# Patient Record
Sex: MALE | Race: WHITE | NOT HISPANIC OR LATINO | Employment: FULL TIME | URBAN - METROPOLITAN AREA
[De-identification: names, ages, dates, MRNs, and addresses within clinical notes are randomized per-mention and may not be internally consistent; named-entity substitution may affect disease eponyms.]

---

## 2017-12-12 DIAGNOSIS — Z00.00 ROUTINE GENERAL MEDICAL EXAMINATION AT A HEALTH CARE FACILITY: Primary | ICD-10-CM

## 2018-11-19 DIAGNOSIS — Z00.00 ROUTINE GENERAL MEDICAL EXAMINATION AT A HEALTH CARE FACILITY: Primary | ICD-10-CM

## 2018-12-17 ENCOUNTER — HOSPITAL ENCOUNTER (OUTPATIENT)
Dept: RADIOLOGY | Facility: HOSPITAL | Age: 53
Discharge: HOME | End: 2018-12-17
Attending: FAMILY MEDICINE

## 2018-12-17 ENCOUNTER — CLINICAL SUPPORT (OUTPATIENT)
Dept: CARDIOLOGY | Facility: CLINIC | Age: 53
End: 2018-12-17

## 2018-12-17 ENCOUNTER — OFFICE VISIT (OUTPATIENT)
Dept: INTERNAL MEDICINE | Facility: CLINIC | Age: 53
End: 2018-12-17
Payer: COMMERCIAL

## 2018-12-17 ENCOUNTER — CLINICAL SUPPORT (OUTPATIENT)
Dept: INTERNAL MEDICINE | Facility: CLINIC | Age: 53
End: 2018-12-17
Payer: COMMERCIAL

## 2018-12-17 ENCOUNTER — CLINICAL SUPPORT (OUTPATIENT)
Dept: AUDIOLOGY | Facility: CLINIC | Age: 53
End: 2018-12-17

## 2018-12-17 ENCOUNTER — CLINICAL SUPPORT (OUTPATIENT)
Dept: PULMONOLOGY | Facility: CLINIC | Age: 53
End: 2018-12-17

## 2018-12-17 VITALS
RESPIRATION RATE: 18 BRPM | OXYGEN SATURATION: 96 % | HEART RATE: 114 BPM | BODY MASS INDEX: 33.64 KG/M2 | WEIGHT: 240.31 LBS | BODY MASS INDEX: 33.64 KG/M2 | HEART RATE: 114 BPM | RESPIRATION RATE: 18 BRPM | SYSTOLIC BLOOD PRESSURE: 144 MMHG | HEIGHT: 71 IN | WEIGHT: 240.31 LBS | HEIGHT: 71 IN | DIASTOLIC BLOOD PRESSURE: 88 MMHG | TEMPERATURE: 99 F | SYSTOLIC BLOOD PRESSURE: 144 MMHG | DIASTOLIC BLOOD PRESSURE: 88 MMHG

## 2018-12-17 DIAGNOSIS — I10 ESSENTIAL HYPERTENSION: ICD-10-CM

## 2018-12-17 DIAGNOSIS — Z00.00 ROUTINE GENERAL MEDICAL EXAMINATION AT A HEALTH CARE FACILITY: Primary | ICD-10-CM

## 2018-12-17 DIAGNOSIS — Z01.12 ENCOUNTER FOR HEARING CONSERVATION AND TREATMENT: Primary | ICD-10-CM

## 2018-12-17 DIAGNOSIS — Z00.00 ROUTINE GENERAL MEDICAL EXAMINATION AT A HEALTH CARE FACILITY: ICD-10-CM

## 2018-12-17 DIAGNOSIS — Z00.00 ENCOUNTER FOR WELLNESS EXAMINATION: Primary | ICD-10-CM

## 2018-12-17 LAB
ALBUMIN SERPL BCP-MCNC: 4.2 G/DL
ALP SERPL-CCNC: 85 U/L
ALT SERPL W/O P-5'-P-CCNC: 35 U/L
ANION GAP SERPL CALC-SCNC: 11 MMOL/L
AST SERPL-CCNC: 22 U/L
BILIRUB SERPL-MCNC: 0.5 MG/DL
BILIRUB UR QL STRIP: NEGATIVE
BUN SERPL-MCNC: 16 MG/DL
CALCIUM SERPL-MCNC: 10.1 MG/DL
CHLORIDE SERPL-SCNC: 100 MMOL/L
CHOLEST SERPL-MCNC: 197 MG/DL
CHOLEST/HDLC SERPL: 4.5 {RATIO}
CLARITY UR: CLEAR
CO2 SERPL-SCNC: 26 MMOL/L
COLOR UR: YELLOW
COMPLEXED PSA SERPL-MCNC: 0.38 NG/ML
CREAT SERPL-MCNC: 0.9 MG/DL
CRP SERPL-MCNC: 23.75 MG/L
DIASTOLIC DYSFUNCTION: NO
ERYTHROCYTE [DISTWIDTH] IN BLOOD BY AUTOMATED COUNT: 12.4 %
EST. GFR  (AFRICAN AMERICAN): >60 ML/MIN/1.73 M^2
EST. GFR  (NON AFRICAN AMERICAN): >60 ML/MIN/1.73 M^2
ESTIMATED AVG GLUCOSE: 100 MG/DL
GLUCOSE SERPL-MCNC: 113 MG/DL
GLUCOSE UR QL STRIP: NEGATIVE
HBA1C MFR BLD HPLC: 5.1 %
HCT VFR BLD AUTO: 46.4 %
HDLC SERPL-MCNC: 44 MG/DL
HDLC SERPL: 22.3 %
HGB BLD-MCNC: 15.5 G/DL
HGB UR QL STRIP: NEGATIVE
KETONES UR QL STRIP: NEGATIVE
LDLC SERPL CALC-MCNC: 130.2 MG/DL
LEUKOCYTE ESTERASE UR QL STRIP: NEGATIVE
MCH RBC QN AUTO: 31.2 PG
MCHC RBC AUTO-ENTMCNC: 33.4 G/DL
MCV RBC AUTO: 93 FL
NITRITE UR QL STRIP: NEGATIVE
NONHDLC SERPL-MCNC: 153 MG/DL
PH UR STRIP: 6 [PH] (ref 5–8)
PLATELET # BLD AUTO: 284 K/UL
PMV BLD AUTO: 11.1 FL
POTASSIUM SERPL-SCNC: 3.9 MMOL/L
PROT SERPL-MCNC: 8 G/DL
PROT UR QL STRIP: NEGATIVE
RBC # BLD AUTO: 4.97 M/UL
SODIUM SERPL-SCNC: 137 MMOL/L
SP GR UR STRIP: 1.01 (ref 1–1.03)
TRIGL SERPL-MCNC: 114 MG/DL
TSH SERPL DL<=0.005 MIU/L-ACNC: 2.28 UIU/ML
URN SPEC COLLECT METH UR: NORMAL
WBC # BLD AUTO: 7.43 K/UL

## 2018-12-17 PROCEDURE — 83655 ASSAY OF LEAD: CPT

## 2018-12-17 PROCEDURE — 83036 HEMOGLOBIN GLYCOSYLATED A1C: CPT

## 2018-12-17 PROCEDURE — 84443 ASSAY THYROID STIM HORMONE: CPT

## 2018-12-17 PROCEDURE — 84153 ASSAY OF PSA TOTAL: CPT

## 2018-12-17 PROCEDURE — 81003 URINALYSIS AUTO W/O SCOPE: CPT | Mod: PO

## 2018-12-17 PROCEDURE — 80053 COMPREHEN METABOLIC PANEL: CPT | Mod: PO

## 2018-12-17 PROCEDURE — 93015 CV STRESS TEST SUPVJ I&R: CPT | Mod: S$GLB,,, | Performed by: NUCLEAR MEDICINE

## 2018-12-17 PROCEDURE — 80061 LIPID PANEL: CPT | Mod: PO

## 2018-12-17 PROCEDURE — 86141 C-REACTIVE PROTEIN HS: CPT

## 2018-12-17 PROCEDURE — 94010 BREATHING CAPACITY TEST: CPT | Mod: S$GLB,,, | Performed by: INTERNAL MEDICINE

## 2018-12-17 PROCEDURE — 99396 PREV VISIT EST AGE 40-64: CPT | Mod: S$GLB,,, | Performed by: FAMILY MEDICINE

## 2018-12-17 PROCEDURE — 85027 COMPLETE CBC AUTOMATED: CPT | Mod: PO

## 2018-12-17 PROCEDURE — 93000 ELECTROCARDIOGRAM COMPLETE: CPT | Mod: S$GLB,,, | Performed by: NUCLEAR MEDICINE

## 2018-12-17 PROCEDURE — 92552 PURE TONE AUDIOMETRY AIR: CPT | Mod: S$GLB,,, | Performed by: AUDIOLOGIST

## 2018-12-17 PROCEDURE — 99999 PR PBB SHADOW E&M-EST. PATIENT-LVL III: CPT | Mod: PBBFAC,,, | Performed by: FAMILY MEDICINE

## 2018-12-17 PROCEDURE — 71046 X-RAY EXAM CHEST 2 VIEWS: CPT | Mod: 26,,, | Performed by: RADIOLOGY

## 2018-12-17 PROCEDURE — 71046 X-RAY EXAM CHEST 2 VIEWS: CPT | Mod: TC,FY,PO

## 2018-12-17 RX ORDER — ASPIRIN 81 MG/1
81 TABLET ORAL DAILY PRN
COMMUNITY

## 2018-12-17 RX ORDER — AMLODIPINE AND BENAZEPRIL HYDROCHLORIDE 5; 20 MG/1; MG/1
1 CAPSULE ORAL DAILY
COMMUNITY
Start: 2018-08-24 | End: 2021-11-29 | Stop reason: SDUPTHER

## 2018-12-17 RX ORDER — AMOXICILLIN 500 MG
1 CAPSULE ORAL DAILY
COMMUNITY

## 2018-12-17 RX ORDER — ASCORBATE CALCIUM 500 MG
500 TABLET ORAL DAILY PRN
COMMUNITY
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
## 2018-12-17 NOTE — PATIENT INSTRUCTIONS
Check on your tetanus immunization. Be sure to follow-up with your colonoscopy scheduling.     Mediterranean Food Guide   People who live in the area around the [place] have a lower risk of heart disease. Researchers have recently shown that following a Mediterranean diet decreases heart disease by 30% in people whom are at-risk.   This lifestyle is built upon daily exercise along with a lot of fruit, vegetables, plant-based proteins, whole grains, fish and smaller amounts of poultry and red meat. Fatty fish (salmon), olive oil, and nuts make this diet higher in fat than the classic heart healthy diet. These fats are mostly unsaturated, and when consumed in place of saturated fat, are good for the heart.  Physical Activity- The Mediterranean Diet pyramid is built upon daily physical activity and exercise. Aim for at least 150 minutes of moderate to vigorous exercise every week. Moderate-to-vigorous exercises include walking at a brisk pace, biking, or swimming, among other activities that elevate your heart rate. Always choose activities that you enjoy and that are safe, in order to be active throughout your life.   Achieve and Maintain a Healthy Weight - The high fat content of the Mediterranean is healthy for your heart, but may lead to increased energy intake and weight gain if you dont pay attention to how much you are eating. If you are trying to lose weight, choose the smaller number of servings from each food group, and try to make your serving sizes match those listed. 2   Food Groups and Servings per day  Serving Sizes    Non-starchy Vegetables   4-8 servings per day  One serving is ½ cup of cooked vegetables or 1 cup raw vegetables   Non-starchy vegetables include artichoke, asparagus, beets, broccoli, [place] sprouts, cauliflower, cabbage, celery, carrots, tomatoes, eggplant, cucumber, onion, green and wax beans, zucchini, turnips, peppers, salad greens and mushrooms. (Potatoes, peas, and  corn are starchy vegetables.)    Fruit   2-4 servings per day  One serving is a small fresh fruit or ½ cup juice or ¼ cup dried fruit   Fresh fruits are preferred because of the fiber and other nutrients they contain. Fruits canned in light syrup or their own juice, and frozen fruit with little or no added sugar are also good choices. Use only small amounts of fruit juice (6 oz per day or less), since even unsweetened juices can contain as much sugar as regular soda.    Legumes and Nuts   1-3 servings per day  Legumes: One serving is ½ cup cooked kidney, black, garbanzo, putnam, soy (edamame), navy beans, split peas, or lentils, or ¼ cup fat free refried beans or baked beans   Nuts and Seeds: One serving is 2 Tbsp. sunflower or sesame seeds, 1 Tbsp. peanut butter,   7-8 walnuts or pecans, 20 peanuts, or 12-15 almonds   Aim for 1-2 servings of nuts or seeds and 1-2 servings of legumes per day. Legumes are high in fiber, protein, and minerals. Nuts are high in unsaturated fat, and may increase HDL without increasing LDL cholesterol levels.    Low-fat Dairy Products   1-3 servings per day  One serving is 1 cup of skim milk, non-fat yogurt, or 1oz of low-fat (part-skim) cheese   Calcium-fortified soy milk, soy yogurt, and soy cheese can take the place of dairy products. If servings of dairy or fortified soy are less than 2 per day, we advise a calcium and vitamin D supplement.    Fish or shellfish   2-3 times a week  One serving is 3 ounces (about the size of a deck of cards)   Cook fish by baking, sautéing, broiling, roasting, grilling, or poaching. Choose fatty fishes like salmon, herring, sardines, or mackerel often. The fat in fish is high in omega-3 fats, so it has healthy effects on triglycerides and blood cells.    Poultry, if desired   1-3 times a week  One serving is 3 ounces (about the size of a deck of cards)   Bake, sauté, stir cole, roast, or grill the poultry you eat, and eat it without the skin.    Whole  Grains and Starchy Vegetables   4-6 servings per day  One serving is about 1 ounce of any of these:   1 slice whole wheat bread ½ cup potatoes, sweet potatoes, corn, or peas   ½ large whole grain bun 1 small whole grain roll   6-inch whole wheat jewell 6 whole grain crackers   ½ cup cooked whole grain cereal (oatmeal, cracked wheat, quinoa)   ½ cup cooked whole wheat pasta, brown rice, or barley   Whole grains are high in fiber and have less effect on blood sugar and triglyceride levels than refined, processed grains like white bread and pasta. Whole grains also keep the stomach full longer, making it easier to control hunger.

## 2018-12-18 LAB
BRPFT: ABNORMAL
CITY: NORMAL
COUNTY: NORMAL
FEF 25 75 LLN: 1.87
FEF 25 75 PRE REF: 57.2 %
FEF 25 75 REF: 3.53
FEV1 FVC LLN: 67
FEV1 FVC PRE REF: 89.3 %
FEV1 FVC REF: 79
FEV1 LLN: 3.07
FEV1 PRE REF: 75.9 %
FEV1 REF: 3.97
FEV6 LLN: 4.03
FEV6 PRE REF: 82.1 %
FEV6 PRE: 4.07 L (ref 4.03–5.89)
FEV6 REF: 4.96
FVC LLN: 3.94
FVC PRE REF: 84.6 %
FVC REF: 5.07
GUARDIAN FIRST NAME: NORMAL
GUARDIAN LAST NAME: NORMAL
LEAD, BLOOD: 1 MCG/DL (ref 0–4.9)
MVV LLN: 127
MVV REF: 150
PEF LLN: 7.55
PEF PRE REF: 72.8 %
PEF REF: 9.92
PHONE #: NORMAL
POSTAL CODE: NORMAL
PRE FEF 25 75: 2.02 L/S (ref 1.87–5.18)
PRE FET 100: 10.88 SEC
PRE FEV1 FVC: 70.21 % (ref 67.48–89.68)
PRE FEV1: 3.01 L (ref 3.07–4.86)
PRE FVC: 4.29 L (ref 3.94–6.19)
PRE PEF: 7.22 L/S (ref 7.55–12.3)
RACE: NORMAL
SPECIMEN SOURCE: NORMAL
STATE OF RESIDENCE: NORMAL
STREET ADDRESS: NORMAL

## 2019-11-25 DIAGNOSIS — Z00.00 ROUTINE GENERAL MEDICAL EXAMINATION AT A HEALTH CARE FACILITY: Primary | ICD-10-CM

## 2019-12-17 ENCOUNTER — CLINICAL SUPPORT (OUTPATIENT)
Dept: INTERNAL MEDICINE | Facility: CLINIC | Age: 54
End: 2019-12-17
Payer: COMMERCIAL

## 2019-12-17 ENCOUNTER — CLINICAL SUPPORT (OUTPATIENT)
Dept: PULMONOLOGY | Facility: CLINIC | Age: 54
End: 2019-12-17

## 2019-12-17 ENCOUNTER — OFFICE VISIT (OUTPATIENT)
Dept: INTERNAL MEDICINE | Facility: CLINIC | Age: 54
End: 2019-12-17
Payer: COMMERCIAL

## 2019-12-17 ENCOUNTER — CLINICAL SUPPORT (OUTPATIENT)
Dept: AUDIOLOGY | Facility: CLINIC | Age: 54
End: 2019-12-17

## 2019-12-17 VITALS
TEMPERATURE: 98 F | BODY MASS INDEX: 31.92 KG/M2 | DIASTOLIC BLOOD PRESSURE: 99 MMHG | SYSTOLIC BLOOD PRESSURE: 150 MMHG | OXYGEN SATURATION: 98 % | HEART RATE: 109 BPM | HEIGHT: 71 IN | WEIGHT: 228 LBS

## 2019-12-17 DIAGNOSIS — Z00.00 ENCOUNTER FOR WELLNESS EXAMINATION: Primary | ICD-10-CM

## 2019-12-17 DIAGNOSIS — Z00.00 ROUTINE GENERAL MEDICAL EXAMINATION AT A HEALTH CARE FACILITY: ICD-10-CM

## 2019-12-17 DIAGNOSIS — Z71.3 DIETARY COUNSELING: ICD-10-CM

## 2019-12-17 DIAGNOSIS — Z01.12 HEARING CONSERVATION AND TREATMENT EXAM: Primary | ICD-10-CM

## 2019-12-17 DIAGNOSIS — Z00.00 ROUTINE GENERAL MEDICAL EXAMINATION AT A HEALTH CARE FACILITY: Primary | ICD-10-CM

## 2019-12-17 DIAGNOSIS — R79.82 ELEVATED C-REACTIVE PROTEIN (CRP): ICD-10-CM

## 2019-12-17 PROBLEM — Z12.11 SCREEN FOR COLON CANCER: Status: ACTIVE | Noted: 2019-09-17

## 2019-12-17 LAB
ALBUMIN SERPL BCP-MCNC: 4.2 G/DL (ref 3.5–5.2)
ALP SERPL-CCNC: 94 U/L (ref 55–135)
ALT SERPL W/O P-5'-P-CCNC: 25 U/L (ref 10–44)
ANION GAP SERPL CALC-SCNC: 11 MMOL/L (ref 8–16)
AST SERPL-CCNC: 24 U/L (ref 10–40)
BILIRUB SERPL-MCNC: 0.6 MG/DL (ref 0.1–1)
BILIRUB UR QL STRIP: NEGATIVE
BRPFT: ABNORMAL
BUN SERPL-MCNC: 10 MG/DL (ref 6–20)
CALCIUM SERPL-MCNC: 9.4 MG/DL (ref 8.7–10.5)
CHLORIDE SERPL-SCNC: 99 MMOL/L (ref 95–110)
CHOLEST SERPL-MCNC: 183 MG/DL (ref 120–199)
CHOLEST/HDLC SERPL: 4.2 {RATIO} (ref 2–5)
CLARITY UR: CLEAR
CO2 SERPL-SCNC: 26 MMOL/L (ref 23–29)
COLOR UR: YELLOW
COMPLEXED PSA SERPL-MCNC: 0.46 NG/ML (ref 0–4)
CREAT SERPL-MCNC: 0.9 MG/DL (ref 0.5–1.4)
ERYTHROCYTE [DISTWIDTH] IN BLOOD BY AUTOMATED COUNT: 12.4 % (ref 11.5–14.5)
EST. GFR  (AFRICAN AMERICAN): >60 ML/MIN/1.73 M^2
EST. GFR  (NON AFRICAN AMERICAN): >60 ML/MIN/1.73 M^2
ESTIMATED AVG GLUCOSE: 108 MG/DL (ref 68–131)
FEF 25 75 LLN: 1.83
FEF 25 75 PRE REF: 50.2 %
FEF 25 75 REF: 3.47
FEV1 FVC LLN: 67
FEV1 FVC PRE REF: 86.5 %
FEV1 FVC REF: 78
FEV1 LLN: 3.04
FEV1 PRE REF: 78.5 %
FEV1 REF: 3.93
FVC LLN: 3.9
FVC PRE REF: 90.5 %
FVC REF: 5.03
GLUCOSE SERPL-MCNC: 122 MG/DL (ref 70–110)
GLUCOSE UR QL STRIP: NEGATIVE
HBA1C MFR BLD HPLC: 5.4 % (ref 4–5.6)
HCT VFR BLD AUTO: 45.1 % (ref 40–54)
HDLC SERPL-MCNC: 44 MG/DL (ref 40–75)
HDLC SERPL: 24 % (ref 20–50)
HGB BLD-MCNC: 15.4 G/DL (ref 14–18)
HGB UR QL STRIP: NEGATIVE
KETONES UR QL STRIP: NEGATIVE
LDLC SERPL CALC-MCNC: 123.8 MG/DL (ref 63–159)
LEUKOCYTE ESTERASE UR QL STRIP: NEGATIVE
MCH RBC QN AUTO: 31.7 PG (ref 27–31)
MCHC RBC AUTO-ENTMCNC: 34.1 G/DL (ref 32–36)
MCV RBC AUTO: 93 FL (ref 82–98)
NITRITE UR QL STRIP: NEGATIVE
NONHDLC SERPL-MCNC: 139 MG/DL
PEF LLN: 7.5
PEF PRE REF: 76 %
PEF REF: 9.87
PH UR STRIP: 6 [PH] (ref 5–8)
PLATELET # BLD AUTO: 292 K/UL (ref 150–350)
PMV BLD AUTO: 10.4 FL (ref 9.2–12.9)
POTASSIUM SERPL-SCNC: 4.2 MMOL/L (ref 3.5–5.1)
PRE FEF 25 75: 1.74 L/S (ref 1.83–5.11)
PRE FET 100: 16.54 SEC
PRE FEV1 FVC: 67.8 % (ref 67.22–89.62)
PRE FEV1: 3.09 L (ref 3.04–4.83)
PRE FVC: 4.56 L (ref 3.9–6.16)
PRE PEF: 7.5 L/S (ref 7.5–12.24)
PROT SERPL-MCNC: 7.8 G/DL (ref 6–8.4)
PROT UR QL STRIP: NEGATIVE
RBC # BLD AUTO: 4.86 M/UL (ref 4.6–6.2)
SODIUM SERPL-SCNC: 136 MMOL/L (ref 136–145)
SP GR UR STRIP: <=1.005 (ref 1–1.03)
TRIGL SERPL-MCNC: 76 MG/DL (ref 30–150)
TSH SERPL DL<=0.005 MIU/L-ACNC: 2.41 UIU/ML (ref 0.4–4)
URN SPEC COLLECT METH UR: NORMAL
WBC # BLD AUTO: 8.55 K/UL (ref 3.9–12.7)

## 2019-12-17 PROCEDURE — 99396 PREV VISIT EST AGE 40-64: CPT | Mod: S$GLB,,, | Performed by: FAMILY MEDICINE

## 2019-12-17 PROCEDURE — 99999 PR PBB SHADOW E&M-EST. PATIENT-LVL III: CPT | Mod: PBBFAC,,, | Performed by: FAMILY MEDICINE

## 2019-12-17 PROCEDURE — 84443 ASSAY THYROID STIM HORMONE: CPT

## 2019-12-17 PROCEDURE — 99999 PR PBB SHADOW E&M-EST. PATIENT-LVL III: ICD-10-PCS | Mod: PBBFAC,,, | Performed by: FAMILY MEDICINE

## 2019-12-17 PROCEDURE — 94010 BREATHING CAPACITY TEST: CPT | Mod: S$GLB,,, | Performed by: INTERNAL MEDICINE

## 2019-12-17 PROCEDURE — 3080F PR MOST RECENT DIASTOLIC BLOOD PRESSURE >= 90 MM HG: ICD-10-PCS | Mod: CPTII,S$GLB,, | Performed by: FAMILY MEDICINE

## 2019-12-17 PROCEDURE — 84153 ASSAY OF PSA TOTAL: CPT

## 2019-12-17 PROCEDURE — 92552 PURE TONE AUDIOMETRY AIR: CPT | Mod: S$GLB,,, | Performed by: AUDIOLOGIST

## 2019-12-17 PROCEDURE — 80053 COMPREHEN METABOLIC PANEL: CPT

## 2019-12-17 PROCEDURE — 99396 PR PREVENTIVE VISIT,EST,40-64: ICD-10-PCS | Mod: S$GLB,,, | Performed by: FAMILY MEDICINE

## 2019-12-17 PROCEDURE — 80061 LIPID PANEL: CPT

## 2019-12-17 PROCEDURE — 85027 COMPLETE CBC AUTOMATED: CPT

## 2019-12-17 PROCEDURE — 92552 PR PURE TONE AUDIOMETRY, AIR: ICD-10-PCS | Mod: S$GLB,,, | Performed by: AUDIOLOGIST

## 2019-12-17 PROCEDURE — 97802 MEDICAL NUTRITION INDIV IN: CPT | Mod: S$GLB,,, | Performed by: INTERNAL MEDICINE

## 2019-12-17 PROCEDURE — 3077F SYST BP >= 140 MM HG: CPT | Mod: CPTII,S$GLB,, | Performed by: FAMILY MEDICINE

## 2019-12-17 PROCEDURE — 3077F PR MOST RECENT SYSTOLIC BLOOD PRESSURE >= 140 MM HG: ICD-10-PCS | Mod: CPTII,S$GLB,, | Performed by: FAMILY MEDICINE

## 2019-12-17 PROCEDURE — 97802 PR MED NUTR THER, 1ST, INDIV, EA 15 MIN: ICD-10-PCS | Mod: S$GLB,,, | Performed by: INTERNAL MEDICINE

## 2019-12-17 PROCEDURE — 81003 URINALYSIS AUTO W/O SCOPE: CPT

## 2019-12-17 PROCEDURE — 83036 HEMOGLOBIN GLYCOSYLATED A1C: CPT

## 2019-12-17 PROCEDURE — 3080F DIAST BP >= 90 MM HG: CPT | Mod: CPTII,S$GLB,, | Performed by: FAMILY MEDICINE

## 2019-12-17 PROCEDURE — 94010 BREATHING CAPACITY TEST: ICD-10-PCS | Mod: S$GLB,,, | Performed by: INTERNAL MEDICINE

## 2019-12-17 PROCEDURE — 83655 ASSAY OF LEAD: CPT

## 2019-12-17 RX ORDER — ASCORBIC ACID 500 MG
500 TABLET ORAL DAILY
COMMUNITY

## 2019-12-17 NOTE — PROGRESS NOTES
Subjective:       Patient ID: Nathan Barrera is a 54 y.o. male.    Chief Complaint: Annual Exam    53 yo male here for executive health physical. He has HTN and is compliant with medication. Daytime readings are at goal at home 120/80s but high at the doctor's office.     Cscope: scheduled 1/28/19 at [facility]  Flu vaccine  Tdap 8/2016  Eye exam: 2019, wears contacts  Dentist: every 6 months    Exercise: sporadic  Intentional weight loss 240-228  Sleeping well; denies daytime sleepiness  Mood: good    does not have any pertinent problems on file.  Past Medical History:   Diagnosis Date    Essential hypertension      Past Surgical History:   Procedure Laterality Date    TONSILLECTOMY, ADENOIDECTOMY       Family History   Problem Relation Age of Onset    Hypertension Mother     Hypertension Father      Social History     Socioeconomic History    Marital status: Single     Spouse name: Not on file    Number of children: Not on file    Years of education: Not on file    Highest education level: Not on file   Occupational History    Not on file   Social Needs    Financial resource strain: Not on file    Food insecurity:     Worry: Not on file     Inability: Not on file    Transportation needs:     Medical: Not on file     Non-medical: Not on file   Tobacco Use    Smoking status: Never Smoker    Smokeless tobacco: Never Used   Substance and Sexual Activity    Alcohol use: Not on file    Drug use: No    Sexual activity: Yes     Partners: Female   Lifestyle    Physical activity:     Days per week: Not on file     Minutes per session: Not on file    Stress: Not on file   Relationships    Social connections:     Talks on phone: Not on file     Gets together: Not on file     Attends [employer] service: Not on file     Active member of club or organization: Not on file     Attends meetings of clubs or organizations: Not on file     Relationship status: Not on file   Other Topics Concern    Not on file    Social History Narrative    Not on file     Review of Systems   Constitutional: Negative for fatigue and unexpected weight change.   HENT: Negative for dental problem and hearing loss.    Eyes: Negative for pain and visual disturbance.   Respiratory: Negative for shortness of breath and wheezing.    Cardiovascular: Negative for chest pain and palpitations.   Gastrointestinal: Negative for abdominal pain, constipation and diarrhea.   Genitourinary: Negative for difficulty urinating and dysuria.   Musculoskeletal: Negative for joint swelling and myalgias.   Skin: Negative for rash and wound.   Neurological: Negative for light-headedness and headaches.   Psychiatric/Behavioral: Negative for dysphoric mood. The patient is not nervous/anxious.        Objective:     Vitals:    12/17/19 0911   BP: (!) 150/99   Pulse: 109   Temp: 98 °F (36.7 °C)        Physical Exam   Constitutional: He is oriented to person, place, and time. He appears well-developed and well-nourished.   HENT:   Head: Normocephalic and atraumatic.   Eyes: Pupils are equal, round, and reactive to light. EOM are normal.   Neck: Normal range of motion. Neck supple.   Cardiovascular: Normal rate, regular rhythm, normal heart sounds and intact distal pulses.   No murmur heard.  Pulmonary/Chest: Effort normal and breath sounds normal. He has no wheezes. He has no rales.   Abdominal: Soft. Bowel sounds are normal. He exhibits no mass. There is no tenderness.   Musculoskeletal: Normal range of motion. He exhibits no deformity.   Neurological: He is alert and oriented to person, place, and time.   Skin: Skin is warm and dry. Capillary refill takes less than 2 seconds. No rash noted.   Psychiatric: He has a normal mood and affect. His behavior is normal. Judgment and thought content normal.   Nursing note and vitals reviewed.      Assessment:       1. Encounter for wellness examination    2. Elevated C-reactive protein (CRP)        Plan:           Problem List  Items Addressed This Visit        Other    Elevated C-reactive protein (CRP)      Other Visit Diagnoses     Encounter for wellness examination    -  Primary

## 2019-12-17 NOTE — PROGRESS NOTES
Executive Health Screening    Nathan Barrera was seen 12/17/2019 for an [facility] hearing screen.      Results reveal a mild-to-moderately severe hearing loss 0117-6952 Hz for the right ear, and  mild-to-moderate hearing loss 0093-6407 Hz for the left ear.     Otoscopy was unremarkable.    Patient was counseled on the above findings.    Recommendations include:    1.  Complete Audiological Evaluation  2.  ENT followup  3.  Possible Hearing aid consult pending candidacy   4.  Wear hearing protective devices around loud noise  5.  Annual audiograms

## 2019-12-17 NOTE — PATIENT INSTRUCTIONS
Shingles vaccine is now due and is available at any retail pharmacy without a prescription.     Mediterranean Food Guide   People who live in the area around the [place] have a lower risk of heart disease. Researchers have recently shown that following a Mediterranean diet decreases heart disease by 30% in people whom are at-risk.   This lifestyle is built upon daily exercise along with a lot of fruit, vegetables, plant-based proteins, whole grains, fish and smaller amounts of poultry and red meat. Fatty fish (salmon), olive oil, and nuts make this diet higher in fat than the classic heart healthy diet. These fats are mostly unsaturated, and when consumed in place of saturated fat, are good for the heart.   Physical Activity- The Mediterranean Diet pyramid is built upon daily physical activity and exercise. Aim for at least 150 minutes of moderate to vigorous exercise every week. Moderate-to-vigorous exercises include walking at a brisk pace, biking, or swimming, among other activities that elevate your heart rate. Always choose activities that you enjoy and that are safe, in order to be active throughout your life.   Achieve and Maintain a Healthy Weight - The high fat content of the Mediterranean is healthy for your heart, but may lead to increased energy intake and weight gain if you dont pay attention to how much you are eating. If you are trying to lose weight, choose the smaller number of servings from each food group, and try to make your serving sizes match those listed. 2   Food Groups and Servings per day  Serving Sizes    Non-starchy Vegetables   4-8 servings per day  One serving is ½ cup of cooked vegetables or 1 cup raw vegetables   Non-starchy vegetables include artichoke, asparagus, beets, broccoli, [place] sprouts, cauliflower, cabbage, celery, carrots, tomatoes, eggplant, cucumber, onion, green and wax beans, zucchini, turnips, peppers, salad greens and mushrooms. (Potatoes, peas, and  corn are starchy vegetables.)    Fruit   2-4 servings per day  One serving is a small fresh fruit or ½ cup juice or ¼ cup dried fruit   Fresh fruits are preferred because of the fiber and other nutrients they contain. Fruits canned in light syrup or their own juice, and frozen fruit with little or no added sugar are also good choices. Use only small amounts of fruit juice (6 oz per day or less), since even unsweetened juices can contain as much sugar as regular soda.    Legumes and Nuts   1-3 servings per day  Legumes: One serving is ½ cup cooked kidney, black, garbanzo, putnam, soy (edamame), navy beans, split peas, or lentils, or ¼ cup fat free refried beans or baked beans   Nuts and Seeds: One serving is 2 Tbsp. sunflower or sesame seeds, 1 Tbsp. peanut butter,   7-8 walnuts or pecans, 20 peanuts, or 12-15 almonds   Aim for 1-2 servings of nuts or seeds and 1-2 servings of legumes per day. Legumes are high in fiber, protein, and minerals. Nuts are high in unsaturated fat, and may increase HDL without increasing LDL cholesterol levels.    Low-fat Dairy Products   1-3 servings per day  One serving is 1 cup of skim milk, non-fat yogurt, or 1oz of low-fat (part-skim) cheese   Calcium-fortified soy milk, soy yogurt, and soy cheese can take the place of dairy products. If servings of dairy or fortified soy are less than 2 per day, we advise a calcium and vitamin D supplement.    Fish or shellfish   2-3 times a week  One serving is 3 ounces (about the size of a deck of cards)   Cook fish by baking, sautéing, broiling, roasting, grilling, or poaching. Choose fatty fishes like salmon, herring, sardines, or mackerel often. The fat in fish is high in omega-3 fats, so it has healthy effects on triglycerides and blood cells.    Poultry, if desired   1-3 times a week  One serving is 3 ounces (about the size of a deck of cards)   Bake, sauté, stir cole, roast, or grill the poultry you eat, and eat it without the skin.    Whole  Grains and Starchy Vegetables   4-6 servings per day  One serving is about 1 ounce of any of these:   1 slice whole wheat bread ½ cup potatoes, sweet potatoes, corn, or peas   ½ large whole grain bun 1 small whole grain roll   6-inch whole wheat jewell 6 whole grain crackers   ½ cup cooked whole grain cereal (oatmeal, cracked wheat, quinoa)   ½ cup cooked whole wheat pasta, brown rice, or barley   Whole grains are high in fiber and have less effect on blood sugar and triglyceride levels than refined, processed grains like white bread and pasta. Whole grains also keep the stomach full longer, making it easier to control hunger.

## 2019-12-17 NOTE — PROGRESS NOTES
"Nutrition Assessment  Client name:  Nathan Barrera  :  1965  Age:  54 y.o.  Gender:  male    Client states:  Very pleasant gentleman from [facility] here for his annual physical with [employer]. Reports working for [employer] for 24 years so far and has approximately 4 years left until [employer]. Currently  and has 2 children. Does not smoke and drinks a few alcoholic beverages per week. Attempted to walk/run a few days weekly for exercise. Reports always having slightly elevated fasting glucose, which has never been a concern to physician. Hypertension is typically well-controlled, except for days at doctors' offices. Attempts to make healthier food choices on some days when dining out. Does not have any questions today in regards to his nutrition or overall health.    Anthropometrics  Height:  5' 11"     Weight:  228 lbs  BMI:  31.80  % Body Fat:  n/a    Clinical Signs/Symptoms  N/V/D:  none  Appetite (Good, Fair, or Poor):  good      Past Medical History:   Diagnosis Date    Essential hypertension        Past Surgical History:   Procedure Laterality Date    TONSILLECTOMY, ADENOIDECTOMY         Medications    has a current medication list which includes the following prescription(s): amlodipine-benazepril 5-20 mg, ascorbate calcium (vitamin c), ascorbic acid (vitamin c), aspirin, fish oil-omega-3 fatty acids, and multivitamin with minerals.    Vitamins, Minerals, and/or Supplements:  Multivitamin     Food/Medication Interactions:  Reviewed     Food Allergies or Intolerances:  NKFA     Social History    Marital status:  Single  Employment:  [employer]    Social History     Tobacco Use    Smoking status: Never Smoker    Smokeless tobacco: Never Used   Substance Use Topics    Alcohol use: Not on file        Lab Reports   Total Cholesterol:  183    Triglycerides:  76  HDL:  44  LDL:  123.8   Glucose:  122  HbA1c:  pending  BP:  150/99     Food History  Breakfast:  Oatmeal/ skips  Mid-morning " Snack:  none  Lunch:  Fast food/ dine out/ skips  Mid-afternoon Snack:  fruit  Dinner:  Vegetable + meat + starch  H.S. Snack:  none  *Fluid intake:  Water, coffee    Exercise History:  Walk/run a few times weekly    Cultural/Spiritual/Personal Preferences:  None noted    Support System:  colleagues    State of Change:  contemplation    Barriers to Change:  none    Diagnosis    Obesity related to excessive energy intake as evidenced by BMI 31.    Intervention    RMR (Method:  [place]):  1907 kcal  Activity Factor:  1.3  MADELAINE:  2479 - 500 =  1979 calories    Goals:  1.  Follow My Plate Method daily.  2.  Choose healthier menu options from Fast Food Guide and Restaurant Guide at least 75% of the time.  3.  Consume 3 meals daily to prevent excessive energy intake later in the day.    Nutrition Education  Labs were not available at time of nutrition consultation, therefore could not be reviewed with patient. Reviewed previous year's labs. Per patient, blood pressure is typically well-controlled daily except for at doctors' appointments. Aware of slightly elevated fasting blood glucose (113), although physician not concerned due to HgbA1c being within range (5.1). Directed patient to Restaurant Guide and Fast Food Guide for some healthier meal selection, due to dining out frequently. Encouraged patient to follow portion control (reviewed My Plate Method) even when dining out. Encouraged patient to have 2 vegetable choices at meals, to help decrease amount of food consume in other food groups. Discussed consuming 3 meals daily to prevent hunger and excessive energy intake later in the day. Encouraged patient to call/e-mail if he has any nutrition-related questions after today's appointment.    Patient verbalized understanding of nutrition education and recommendations received.    Handouts Provided  Meal Planning Guide  Restaurant Guide  Eat Fit Shopping List  Eat Fit Bee  Fast Food Guide  My Plate  Method  Heart-Healthy Nutrition Therapy    Monitoring/Evaluation    Monitor the following:  Weight  BMI  Caloric intake  Labs:  CMP, Lipid Panel, HgbA1c    Follow Up Plan:  Communication with referring healthcare provider is unnecessary at this time as patient presented as part of annual wellness exam.  However, will follow up with patient in 1-2 years.

## 2019-12-19 LAB
CITY: NORMAL
COUNTY: NORMAL
GUARDIAN FIRST NAME: NORMAL
GUARDIAN LAST NAME: NORMAL
LEAD BLD-MCNC: <1 MCG/DL (ref 0–4.9)
PHONE #: NORMAL
POSTAL CODE: NORMAL
RACE: NORMAL
SPECIMEN SOURCE: NORMAL
STATE OF RESIDENCE: NORMAL
STREET ADDRESS: NORMAL

## 2020-12-21 ENCOUNTER — CLINICAL SUPPORT (OUTPATIENT)
Dept: INTERNAL MEDICINE | Facility: CLINIC | Age: 55
End: 2020-12-21
Payer: COMMERCIAL

## 2020-12-21 ENCOUNTER — CLINICAL SUPPORT (OUTPATIENT)
Dept: AUDIOLOGY | Facility: CLINIC | Age: 55
End: 2020-12-21

## 2020-12-21 ENCOUNTER — OFFICE VISIT (OUTPATIENT)
Dept: INTERNAL MEDICINE | Facility: CLINIC | Age: 55
End: 2020-12-21
Payer: COMMERCIAL

## 2020-12-21 VITALS
BODY MASS INDEX: 34.57 KG/M2 | SYSTOLIC BLOOD PRESSURE: 142 MMHG | DIASTOLIC BLOOD PRESSURE: 82 MMHG | HEIGHT: 71 IN | WEIGHT: 246.94 LBS | HEART RATE: 101 BPM | TEMPERATURE: 98 F | OXYGEN SATURATION: 98 %

## 2020-12-21 DIAGNOSIS — Z01.12 HEARING CONSERVATION AND TREATMENT EXAM: Primary | ICD-10-CM

## 2020-12-21 DIAGNOSIS — I10 ESSENTIAL HYPERTENSION: ICD-10-CM

## 2020-12-21 DIAGNOSIS — Z00.00 ROUTINE GENERAL MEDICAL EXAMINATION AT A HEALTH CARE FACILITY: Primary | ICD-10-CM

## 2020-12-21 DIAGNOSIS — Z71.3 DIETARY COUNSELING: ICD-10-CM

## 2020-12-21 LAB
ALBUMIN SERPL BCP-MCNC: 4.2 G/DL (ref 3.5–5.2)
ALP SERPL-CCNC: 80 U/L (ref 55–135)
ALT SERPL W/O P-5'-P-CCNC: 33 U/L (ref 10–44)
ANION GAP SERPL CALC-SCNC: 13 MMOL/L (ref 8–16)
AST SERPL-CCNC: 23 U/L (ref 10–40)
BILIRUB SERPL-MCNC: 0.6 MG/DL (ref 0.1–1)
BILIRUB UR QL STRIP: NEGATIVE
BUN SERPL-MCNC: 12 MG/DL (ref 6–20)
CALCIUM SERPL-MCNC: 9.1 MG/DL (ref 8.7–10.5)
CHLORIDE SERPL-SCNC: 98 MMOL/L (ref 95–110)
CHOLEST SERPL-MCNC: 198 MG/DL (ref 120–199)
CHOLEST/HDLC SERPL: 4 {RATIO} (ref 2–5)
CLARITY UR: CLEAR
CO2 SERPL-SCNC: 26 MMOL/L (ref 23–29)
COLOR UR: YELLOW
COMPLEXED PSA SERPL-MCNC: 0.44 NG/ML (ref 0–4)
CREAT SERPL-MCNC: 0.9 MG/DL (ref 0.5–1.4)
ERYTHROCYTE [DISTWIDTH] IN BLOOD BY AUTOMATED COUNT: 12.4 % (ref 11.5–14.5)
EST. GFR  (AFRICAN AMERICAN): >60 ML/MIN/1.73 M^2
EST. GFR  (NON AFRICAN AMERICAN): >60 ML/MIN/1.73 M^2
ESTIMATED AVG GLUCOSE: 105 MG/DL (ref 68–131)
GLUCOSE SERPL-MCNC: 119 MG/DL (ref 70–110)
GLUCOSE UR QL STRIP: NEGATIVE
HBA1C MFR BLD HPLC: 5.3 % (ref 4–5.6)
HCT VFR BLD AUTO: 44.9 % (ref 40–54)
HDLC SERPL-MCNC: 50 MG/DL (ref 40–75)
HDLC SERPL: 25.3 % (ref 20–50)
HGB BLD-MCNC: 15 G/DL (ref 14–18)
HGB UR QL STRIP: NEGATIVE
KETONES UR QL STRIP: NEGATIVE
LDLC SERPL CALC-MCNC: 117.4 MG/DL (ref 63–159)
LEUKOCYTE ESTERASE UR QL STRIP: NEGATIVE
MCH RBC QN AUTO: 31.8 PG (ref 27–31)
MCHC RBC AUTO-ENTMCNC: 33.4 G/DL (ref 32–36)
MCV RBC AUTO: 95 FL (ref 82–98)
NITRITE UR QL STRIP: NEGATIVE
NONHDLC SERPL-MCNC: 148 MG/DL
PH UR STRIP: 8 [PH] (ref 5–8)
PLATELET # BLD AUTO: 261 K/UL (ref 150–350)
PMV BLD AUTO: 10.6 FL (ref 9.2–12.9)
POTASSIUM SERPL-SCNC: 3.6 MMOL/L (ref 3.5–5.1)
PROT SERPL-MCNC: 7.3 G/DL (ref 6–8.4)
PROT UR QL STRIP: NEGATIVE
RBC # BLD AUTO: 4.71 M/UL (ref 4.6–6.2)
SODIUM SERPL-SCNC: 137 MMOL/L (ref 136–145)
SP GR UR STRIP: 1.01 (ref 1–1.03)
T4 FREE SERPL-MCNC: 1.08 NG/DL (ref 0.71–1.51)
TRIGL SERPL-MCNC: 153 MG/DL (ref 30–150)
TSH SERPL DL<=0.005 MIU/L-ACNC: 4.07 UIU/ML (ref 0.4–4)
URN SPEC COLLECT METH UR: NORMAL
WBC # BLD AUTO: 7.81 K/UL (ref 3.9–12.7)

## 2020-12-21 PROCEDURE — 3079F DIAST BP 80-89 MM HG: CPT | Mod: CPTII,S$GLB,, | Performed by: INTERNAL MEDICINE

## 2020-12-21 PROCEDURE — 3079F PR MOST RECENT DIASTOLIC BLOOD PRESSURE 80-89 MM HG: ICD-10-PCS | Mod: CPTII,S$GLB,, | Performed by: INTERNAL MEDICINE

## 2020-12-21 PROCEDURE — 85027 COMPLETE CBC AUTOMATED: CPT

## 2020-12-21 PROCEDURE — 99999 PR PBB SHADOW E&M-EST. PATIENT-LVL III: ICD-10-PCS | Mod: PBBFAC,,, | Performed by: INTERNAL MEDICINE

## 2020-12-21 PROCEDURE — 92552 PURE TONE AUDIOMETRY AIR: CPT | Mod: S$GLB,,, | Performed by: AUDIOLOGIST-HEARING AID FITTER

## 2020-12-21 PROCEDURE — 92552 PR PURE TONE AUDIOMETRY, AIR: ICD-10-PCS | Mod: S$GLB,,, | Performed by: AUDIOLOGIST-HEARING AID FITTER

## 2020-12-21 PROCEDURE — 84439 ASSAY OF FREE THYROXINE: CPT

## 2020-12-21 PROCEDURE — 86703 HIV-1/HIV-2 1 RESULT ANTBDY: CPT

## 2020-12-21 PROCEDURE — 84443 ASSAY THYROID STIM HORMONE: CPT

## 2020-12-21 PROCEDURE — 83036 HEMOGLOBIN GLYCOSYLATED A1C: CPT

## 2020-12-21 PROCEDURE — 3008F BODY MASS INDEX DOCD: CPT | Mod: CPTII,S$GLB,, | Performed by: INTERNAL MEDICINE

## 2020-12-21 PROCEDURE — 81003 URINALYSIS AUTO W/O SCOPE: CPT

## 2020-12-21 PROCEDURE — 3077F PR MOST RECENT SYSTOLIC BLOOD PRESSURE >= 140 MM HG: ICD-10-PCS | Mod: CPTII,S$GLB,, | Performed by: INTERNAL MEDICINE

## 2020-12-21 PROCEDURE — 80061 LIPID PANEL: CPT

## 2020-12-21 PROCEDURE — 99396 PR PREVENTIVE VISIT,EST,40-64: ICD-10-PCS | Mod: S$GLB,,, | Performed by: INTERNAL MEDICINE

## 2020-12-21 PROCEDURE — 97802 PR MED NUTR THER, 1ST, INDIV, EA 15 MIN: ICD-10-PCS | Mod: S$GLB,,, | Performed by: INTERNAL MEDICINE

## 2020-12-21 PROCEDURE — 84153 ASSAY OF PSA TOTAL: CPT

## 2020-12-21 PROCEDURE — 97802 MEDICAL NUTRITION INDIV IN: CPT | Mod: S$GLB,,, | Performed by: INTERNAL MEDICINE

## 2020-12-21 PROCEDURE — 86803 HEPATITIS C AB TEST: CPT

## 2020-12-21 PROCEDURE — 1126F AMNT PAIN NOTED NONE PRSNT: CPT | Mod: S$GLB,,, | Performed by: INTERNAL MEDICINE

## 2020-12-21 PROCEDURE — 3077F SYST BP >= 140 MM HG: CPT | Mod: CPTII,S$GLB,, | Performed by: INTERNAL MEDICINE

## 2020-12-21 PROCEDURE — 99396 PREV VISIT EST AGE 40-64: CPT | Mod: S$GLB,,, | Performed by: INTERNAL MEDICINE

## 2020-12-21 PROCEDURE — 80053 COMPREHEN METABOLIC PANEL: CPT

## 2020-12-21 PROCEDURE — 3008F PR BODY MASS INDEX (BMI) DOCUMENTED: ICD-10-PCS | Mod: CPTII,S$GLB,, | Performed by: INTERNAL MEDICINE

## 2020-12-21 PROCEDURE — 99999 PR PBB SHADOW E&M-EST. PATIENT-LVL III: CPT | Mod: PBBFAC,,, | Performed by: INTERNAL MEDICINE

## 2020-12-21 PROCEDURE — 1126F PR PAIN SEVERITY QUANTIFIED, NO PAIN PRESENT: ICD-10-PCS | Mod: S$GLB,,, | Performed by: INTERNAL MEDICINE

## 2020-12-21 PROCEDURE — 83655 ASSAY OF LEAD: CPT

## 2020-12-21 NOTE — PROGRESS NOTES
"Subjective:      Patient ID: Nathan Barrera is a 55 y.o. male.    Chief Complaint: Executive Health    HPI     It was a pleasure to see you in my office for your executive health physical on December 21, 2020.  At the time of our meeting your 55 years old.  You have a past medical history of hypertension and your blood pressure is elevated at today's visit.    PCP: [facility].       You have no known drug allergies.  Year daily medications include Lotrel 5-20 mg, aspirin 81 mg, multivitamin, fish oil, vitamin-C.     Family history: Father and mother is overall healthy. Brother is healthy. Sister with history of tumor on appendix. Children are healthy.     Surgical history: tonsillectomy    We discussed your preventive health maintenance.  You have a colonoscopy completed January 2020 at [facility] with polyps found. You reported repeat due 2025.  Tetanus immunization was given August of 2016. You declined your flu vaccine.   Check with your pharmacy regarding new shingles vaccine.     Review of Systems   Constitutional: Negative for chills and fever.   HENT: Negative for ear pain and sore throat.    Respiratory: Negative for cough.    Cardiovascular: Negative for chest pain.   Gastrointestinal: Negative for abdominal pain and blood in stool.   Genitourinary: Negative for dysuria and hematuria.   Neurological: Negative for seizures and syncope.     Objective:   BP (!) 142/82 (BP Location: Left arm, Patient Position: Sitting, BP Method: Large (Manual))   Pulse 101   Temp 98.1 °F (36.7 °C) (Tympanic)   Ht 5' 11" (1.803 m)   Wt 112 kg (246 lb 14.6 oz)   SpO2 98%   BMI 34.44 kg/m²     Physical Exam  Constitutional:       General: He is not in acute distress.     Appearance: He is well-developed.   HENT:      Head: Normocephalic and atraumatic.   Eyes:      Pupils: Pupils are equal, round, and reactive to light.   Neck:      Musculoskeletal: Neck supple.      Thyroid: No thyromegaly.      Vascular: No carotid " bruit.   Cardiovascular:      Rate and Rhythm: Normal rate and regular rhythm.   Pulmonary:      Breath sounds: Normal breath sounds. No wheezing or rales.   Abdominal:      General: Bowel sounds are normal.      Palpations: Abdomen is soft.      Tenderness: There is no abdominal tenderness.   Lymphadenopathy:      Cervical: No cervical adenopathy.   Skin:     General: Skin is warm and dry.   Neurological:      Mental Status: He is alert and oriented to person, place, and time.   Psychiatric:         Behavior: Behavior normal.       Results reveal a mild-to-moderate hearing loss 250-8000 Hz for the right ear, and  mild-to-moderate hearing loss 250-8000 Hz for the left ear.    Recommendations:  1.  Complete Audiological Evaluation  2.  ENT followup  3.  Possible Hearing aid consult pending candidacy   4.  Wear hearing protective devices around loud noise  5.  Annual audiograms     Assessment:     1. Routine general medical examination at a health care facility    2. Essential hypertension      Plan:   Routine general medical examination at a health care facility  -     Hepatitis C Antibody; Future; Expected date: 12/21/2020  -     HIV 1/2 Ag/Ab (4th Gen); Future; Expected date: 12/21/2020    Essential hypertension      Heart healthy diet and reg exercise  HM reviewed  Patient declined ENT referral.  Blood pressure not at goal.  Advised patient to continue to monitor and discuss medication adjustments with primary care physician.        Problem List Items Addressed This Visit        Cardiac/Vascular    Hypertension      Other Visit Diagnoses     Routine general medical examination at a health care facility    -  Primary    Relevant Orders    Hepatitis C Antibody    HIV 1/2 Ag/Ab (4th Gen)          Follow up if symptoms worsen or fail to improve.

## 2020-12-21 NOTE — PROGRESS NOTES
"Nutrition Assessment  Client name:  Nathan Barrera  :  1965  Age:  55 y.o.  Gender:  male    Client states:  Pleasant gentleman from [facility] present for annual physical with [employer]. Last physical was completed in 2019. Reports being "off and on" with healthy habits for weight loss. Estimated to be 10 lbs heavier today. Knows what changes need to be made, just need to make it a point to be consistent with habits. Food and exercise history provided below. No questions today.    Anthropometrics  Height:  5' 11"     Weight:  246 lbs  BMI:  34.44  % Body Fat:  n/a    Clinical Signs/Symptoms  N/V/D:  none  Appetite:  good       Past Medical History:   Diagnosis Date    Essential hypertension        Past Surgical History:   Procedure Laterality Date    TONSILLECTOMY, ADENOIDECTOMY         Medications    has a current medication list which includes the following prescription(s): amlodipine-benazepril 5-20 mg, ascorbate calcium (vitamin c), ascorbic acid (vitamin c), aspirin, fish oil-omega-3 fatty acids, and multivitamin with minerals.    Vitamins, Minerals, and/or Supplements:  Multivitamin, Fish oil, Vitamin C     Food/Medication Interactions:  Reviewed     Food Allergies or Intolerances:  NKFA     Social History    Marital status:  Single  Employment:  [employer]    Social History     Tobacco Use    Smoking status: Never Smoker    Smokeless tobacco: Never Used   Substance Use Topics    Alcohol use: Not on file        Lab Reports   Total Cholesterol:  198    Triglycerides:  153  HDL:  50  LDL:  117.4   Glucose:  119  HbA1c:  5.3  BP Readings from Last 1 Encounters:   19 (!) 150/99       Food History  Yesterday:  Breakfast:  Amezcua + eggs  Dinner:  Burgers + baked fries    *Fluid intake:  Water, sweet tea at work (but not every shift), coffee    Exercise History:  Tries to walk for exercise, inconsistent routine     Cultural/Spiritual/Personal Preferences:  None " identified    Support System:  none noted    State of Change:  Contemplation    Barriers to Change:  none    Diagnosis    Other: Obesity related to excess energy intake as evidenced by BMI 34.    Intervention    RMR (Method:  [place]):  1984 kcal  Activity Factor:  1.3    MADELAINE:  2579 - 500 = 2079 kcal    Goals:  1.  Consistency with 3 meals + 1 snack daily.  2.  Reduce sugar/ white processed items intake in half.      Nutrition Education  Lipid panel and glucose lab results were available at time of nutrition consultation and were reviewed with patient. Made patient aware of elevated Triglycerides (153) and Glucose (119). Discussed sources of sugar/carbohydrates in patient's diet and ways to make improvements to promote improved labs. Reminded patient of nutrition education provided last year. Provided contact information.    Patient verbalized understanding of nutrition education and recommendations received.    Handouts Provided  Meal Planning Guide  Restaurant Guide  Eat Fit Shopping List  Fast Food Guide  Plate Method  Healthy Snack List    Monitoring/Evaluation    Monitor the following:  Weight  BMI  Caloric intake  Labs:  Lipid Panel, Glucose    Follow Up Plan:  Communication with referring healthcare provider is unnecessary at this time as patient presented as part of annual wellness exam.  However, will follow up with patient in 1-2 years.

## 2020-12-21 NOTE — PROGRESS NOTES
Executive Health Screening    Nathan Barrera was seen 12/21/2020 for an executive health hearing screen.      Results reveal a mild-to-moderate hearing loss 250-8000 Hz for the right ear, and  mild-to-moderate hearing loss 250-8000 Hz for the left ear.     Otoscopy was unremarkable.    Recommendations:  1.  Complete Audiological Evaluation  2.  ENT followup  3.  Possible Hearing aid consult pending candidacy   4.  Wear hearing protective devices around loud noise  5.  Annual audiograms    Patient was counseled on the above findings.

## 2020-12-22 LAB
HCV AB SERPL QL IA: NEGATIVE
HIV 1+2 AB+HIV1 P24 AG SERPL QL IA: NEGATIVE
LEAD BLD-MCNC: <1 MCG/DL
SPECIMEN SOURCE: NORMAL
STATE OF RESIDENCE: NORMAL

## 2021-02-23 ENCOUNTER — PATIENT MESSAGE (OUTPATIENT)
Dept: INTERNAL MEDICINE | Facility: CLINIC | Age: 56
End: 2021-02-23

## 2021-02-23 RX ORDER — AMLODIPINE AND BENAZEPRIL HYDROCHLORIDE 5; 20 MG/1; MG/1
1 CAPSULE ORAL DAILY
Status: CANCELLED | OUTPATIENT
Start: 2021-02-23

## 2021-02-24 ENCOUNTER — TELEPHONE (OUTPATIENT)
Dept: INTERNAL MEDICINE | Facility: CLINIC | Age: 56
End: 2021-02-24

## 2021-02-24 RX ORDER — AMLODIPINE AND BENAZEPRIL HYDROCHLORIDE 5; 20 MG/1; MG/1
1 CAPSULE ORAL DAILY
Status: CANCELLED | OUTPATIENT
Start: 2021-02-24

## 2021-04-29 ENCOUNTER — PATIENT MESSAGE (OUTPATIENT)
Dept: RESEARCH | Facility: HOSPITAL | Age: 56
End: 2021-04-29

## 2021-11-29 NOTE — TELEPHONE ENCOUNTER
----- Message from Ragini De Jesus sent at 11/29/2021  8:12 AM CST -----  Requesting an RX refill or new RX.  Is this a refill or new RX: Refill  RX name and strength amlodipine-benazepril 5-20 mg (LOTREL) 5-20 mg per capsule  Is this a 30 day or 90 day RX: 90  Patient advised that in the future they can use their [facility] account to request a refill?:  Phone  Pharmacy name and phone # [facility] - [place], [place] - [address]   Phone:  [phone] Fax:  [phone]

## 2021-11-30 RX ORDER — AMLODIPINE AND BENAZEPRIL HYDROCHLORIDE 5; 20 MG/1; MG/1
1 CAPSULE ORAL DAILY
Qty: 30 CAPSULE | Refills: 0 | Status: SHIPPED | OUTPATIENT
Start: 2021-11-30 | End: 2021-12-09 | Stop reason: SDUPTHER

## 2021-12-02 ENCOUNTER — PATIENT MESSAGE (OUTPATIENT)
Dept: ADMINISTRATIVE | Facility: HOSPITAL | Age: 56
End: 2021-12-02
Payer: COMMERCIAL

## 2021-12-09 ENCOUNTER — CLINICAL SUPPORT (OUTPATIENT)
Dept: INTERNAL MEDICINE | Facility: CLINIC | Age: 56
End: 2021-12-09
Payer: COMMERCIAL

## 2021-12-09 ENCOUNTER — CLINICAL SUPPORT (OUTPATIENT)
Dept: AUDIOLOGY | Facility: CLINIC | Age: 56
End: 2021-12-09

## 2021-12-09 ENCOUNTER — OFFICE VISIT (OUTPATIENT)
Dept: INTERNAL MEDICINE | Facility: CLINIC | Age: 56
End: 2021-12-09
Payer: COMMERCIAL

## 2021-12-09 VITALS
HEIGHT: 71 IN | BODY MASS INDEX: 34.57 KG/M2 | HEART RATE: 102 BPM | WEIGHT: 246.94 LBS | DIASTOLIC BLOOD PRESSURE: 90 MMHG | OXYGEN SATURATION: 97 % | SYSTOLIC BLOOD PRESSURE: 144 MMHG | RESPIRATION RATE: 18 BRPM | TEMPERATURE: 99 F

## 2021-12-09 DIAGNOSIS — Z00.00 ROUTINE GENERAL MEDICAL EXAMINATION AT A HEALTH CARE FACILITY: Primary | ICD-10-CM

## 2021-12-09 DIAGNOSIS — I10 PRIMARY HYPERTENSION: ICD-10-CM

## 2021-12-09 DIAGNOSIS — Z01.12 HEARING CONSERVATION AND TREATMENT EXAM: Primary | ICD-10-CM

## 2021-12-09 DIAGNOSIS — Z71.3 DIETARY COUNSELING: ICD-10-CM

## 2021-12-09 DIAGNOSIS — H91.90 HEARING LOSS, UNSPECIFIED HEARING LOSS TYPE, UNSPECIFIED LATERALITY: ICD-10-CM

## 2021-12-09 LAB
ALBUMIN SERPL BCP-MCNC: 4.3 G/DL (ref 3.5–5.2)
ALP SERPL-CCNC: 80 U/L (ref 55–135)
ALT SERPL W/O P-5'-P-CCNC: 45 U/L (ref 10–44)
ANION GAP SERPL CALC-SCNC: 12 MMOL/L (ref 8–16)
AST SERPL-CCNC: 26 U/L (ref 10–40)
BILIRUB SERPL-MCNC: 0.5 MG/DL (ref 0.1–1)
BILIRUB UR QL STRIP: NEGATIVE
BUN SERPL-MCNC: 17 MG/DL (ref 6–20)
CALCIUM SERPL-MCNC: 9.4 MG/DL (ref 8.7–10.5)
CHLORIDE SERPL-SCNC: 102 MMOL/L (ref 95–110)
CHOLEST SERPL-MCNC: 219 MG/DL (ref 120–199)
CHOLEST/HDLC SERPL: 4.2 {RATIO} (ref 2–5)
CLARITY UR: CLEAR
CO2 SERPL-SCNC: 26 MMOL/L (ref 23–29)
COLOR UR: YELLOW
COMPLEXED PSA SERPL-MCNC: 0.39 NG/ML (ref 0–4)
CREAT SERPL-MCNC: 1 MG/DL (ref 0.5–1.4)
ERYTHROCYTE [DISTWIDTH] IN BLOOD BY AUTOMATED COUNT: 12.7 % (ref 11.5–14.5)
EST. GFR  (AFRICAN AMERICAN): >60 ML/MIN/1.73 M^2
EST. GFR  (NON AFRICAN AMERICAN): >60 ML/MIN/1.73 M^2
ESTIMATED AVG GLUCOSE: 108 MG/DL (ref 68–131)
GLUCOSE SERPL-MCNC: 118 MG/DL (ref 70–110)
GLUCOSE UR QL STRIP: NEGATIVE
HBA1C MFR BLD: 5.4 % (ref 4–5.6)
HCT VFR BLD AUTO: 46.8 % (ref 40–54)
HDLC SERPL-MCNC: 52 MG/DL (ref 40–75)
HDLC SERPL: 23.7 % (ref 20–50)
HGB BLD-MCNC: 15.7 G/DL (ref 14–18)
HGB UR QL STRIP: NEGATIVE
KETONES UR QL STRIP: NEGATIVE
LDLC SERPL CALC-MCNC: 139.6 MG/DL (ref 63–159)
LEUKOCYTE ESTERASE UR QL STRIP: NEGATIVE
MCH RBC QN AUTO: 31.6 PG (ref 27–31)
MCHC RBC AUTO-ENTMCNC: 33.5 G/DL (ref 32–36)
MCV RBC AUTO: 94 FL (ref 82–98)
NITRITE UR QL STRIP: NEGATIVE
NONHDLC SERPL-MCNC: 167 MG/DL
PH UR STRIP: 6 [PH] (ref 5–8)
PLATELET # BLD AUTO: 278 K/UL (ref 150–450)
PMV BLD AUTO: 10.4 FL (ref 9.2–12.9)
POTASSIUM SERPL-SCNC: 4 MMOL/L (ref 3.5–5.1)
PROT SERPL-MCNC: 7.8 G/DL (ref 6–8.4)
PROT UR QL STRIP: NEGATIVE
RBC # BLD AUTO: 4.97 M/UL (ref 4.6–6.2)
SODIUM SERPL-SCNC: 140 MMOL/L (ref 136–145)
SP GR UR STRIP: 1.02 (ref 1–1.03)
TRIGL SERPL-MCNC: 137 MG/DL (ref 30–150)
TSH SERPL DL<=0.005 MIU/L-ACNC: 3.04 UIU/ML (ref 0.4–4)
URN SPEC COLLECT METH UR: NORMAL
WBC # BLD AUTO: 7.57 K/UL (ref 3.9–12.7)

## 2021-12-09 PROCEDURE — 99999 PR PBB SHADOW E&M-EST. PATIENT-LVL III: ICD-10-PCS | Mod: PBBFAC,,, | Performed by: INTERNAL MEDICINE

## 2021-12-09 PROCEDURE — 80053 COMPREHEN METABOLIC PANEL: CPT | Performed by: INTERNAL MEDICINE

## 2021-12-09 PROCEDURE — 97802 MEDICAL NUTRITION INDIV IN: CPT | Mod: S$GLB,,, | Performed by: INTERNAL MEDICINE

## 2021-12-09 PROCEDURE — 83655 ASSAY OF LEAD: CPT | Performed by: INTERNAL MEDICINE

## 2021-12-09 PROCEDURE — 83036 HEMOGLOBIN GLYCOSYLATED A1C: CPT | Performed by: INTERNAL MEDICINE

## 2021-12-09 PROCEDURE — 81003 URINALYSIS AUTO W/O SCOPE: CPT | Performed by: INTERNAL MEDICINE

## 2021-12-09 PROCEDURE — 97802 PR MED NUTR THER, 1ST, INDIV, EA 15 MIN: ICD-10-PCS | Mod: S$GLB,,, | Performed by: INTERNAL MEDICINE

## 2021-12-09 PROCEDURE — 4010F ACE/ARB THERAPY RXD/TAKEN: CPT | Mod: CPTII,S$GLB,, | Performed by: INTERNAL MEDICINE

## 2021-12-09 PROCEDURE — 99396 PREV VISIT EST AGE 40-64: CPT | Mod: S$GLB,,, | Performed by: INTERNAL MEDICINE

## 2021-12-09 PROCEDURE — 92552 PR PURE TONE AUDIOMETRY, AIR: ICD-10-PCS | Mod: S$GLB,,, | Performed by: AUDIOLOGIST-HEARING AID FITTER

## 2021-12-09 PROCEDURE — 99999 PR PBB SHADOW E&M-EST. PATIENT-LVL III: CPT | Mod: PBBFAC,,, | Performed by: INTERNAL MEDICINE

## 2021-12-09 PROCEDURE — 99396 PR PREVENTIVE VISIT,EST,40-64: ICD-10-PCS | Mod: S$GLB,,, | Performed by: INTERNAL MEDICINE

## 2021-12-09 PROCEDURE — 84443 ASSAY THYROID STIM HORMONE: CPT | Performed by: INTERNAL MEDICINE

## 2021-12-09 PROCEDURE — 92552 PURE TONE AUDIOMETRY AIR: CPT | Mod: S$GLB,,, | Performed by: AUDIOLOGIST-HEARING AID FITTER

## 2021-12-09 PROCEDURE — 84153 ASSAY OF PSA TOTAL: CPT | Performed by: INTERNAL MEDICINE

## 2021-12-09 PROCEDURE — 4010F PR ACE/ARB THEARPY RXD/TAKEN: ICD-10-PCS | Mod: CPTII,S$GLB,, | Performed by: INTERNAL MEDICINE

## 2021-12-09 PROCEDURE — 80061 LIPID PANEL: CPT | Performed by: INTERNAL MEDICINE

## 2021-12-09 PROCEDURE — 85027 COMPLETE CBC AUTOMATED: CPT | Performed by: INTERNAL MEDICINE

## 2021-12-09 RX ORDER — AMLODIPINE AND BENAZEPRIL HYDROCHLORIDE 5; 20 MG/1; MG/1
1 CAPSULE ORAL DAILY
Qty: 90 CAPSULE | Refills: 1 | Status: SHIPPED | OUTPATIENT
Start: 2021-12-09 | End: 2022-02-01

## 2021-12-11 LAB
LEAD BLD-MCNC: 1.2 MCG/DL
SPECIMEN SOURCE: NORMAL
STATE OF RESIDENCE: NORMAL

## 2022-02-01 DIAGNOSIS — I10 PRIMARY HYPERTENSION: ICD-10-CM

## 2022-02-01 RX ORDER — AMLODIPINE AND BENAZEPRIL HYDROCHLORIDE 5; 20 MG/1; MG/1
CAPSULE ORAL
Qty: 30 CAPSULE | Refills: 0 | Status: SHIPPED | OUTPATIENT
Start: 2022-02-01 | End: 2022-07-01

## 2022-02-01 NOTE — TELEPHONE ENCOUNTER
No new care gaps identified.  Powered by [email or web address]. Reference number: [id number].   2/01/2022 10:41:18 AM CST

## 2022-07-01 DIAGNOSIS — I10 PRIMARY HYPERTENSION: ICD-10-CM

## 2022-07-01 RX ORDER — AMLODIPINE AND BENAZEPRIL HYDROCHLORIDE 5; 20 MG/1; MG/1
CAPSULE ORAL
Qty: 90 CAPSULE | Refills: 0 | Status: SHIPPED | OUTPATIENT
Start: 2022-07-01

## 2022-07-01 NOTE — TELEPHONE ENCOUNTER
No new care gaps identified.  [facility] Embedded Care Gaps. Reference number: [id number]. 7/01/2022   8:29:46 AM KRISTINET

## 2022-07-01 NOTE — TELEPHONE ENCOUNTER
Refill Routing Note   Medication(s) are not appropriate for processing by [facility] for the following reason(s):      - Required vitals are abnormal    [facility] action(s):  Defer          Medication reconciliation completed: No     Appointments  past 12m or future 3m with PCP    Date Provider   Last Visit   12/9/2021 Fredo Hernandez MD   Next Visit   Visit date not found Fredo Hernandez MD   ED visits in past 90 days: 0        Note composed:12:37 PM 07/01/2022

## 2022-10-20 ENCOUNTER — PATIENT MESSAGE (OUTPATIENT)
Dept: ADMINISTRATIVE | Facility: HOSPITAL | Age: 57
End: 2022-10-20

## 2022-12-03 ENCOUNTER — PATIENT MESSAGE (OUTPATIENT)
Dept: ADMINISTRATIVE | Facility: HOSPITAL | Age: 57
End: 2022-12-03

## 2023-01-05 DIAGNOSIS — Z00.00 ROUTINE GENERAL MEDICAL EXAMINATION AT A HEALTH CARE FACILITY: Primary | ICD-10-CM

## 2023-01-11 DIAGNOSIS — I10 HYPERTENSION: ICD-10-CM

## 2023-01-18 ENCOUNTER — CLINICAL SUPPORT (OUTPATIENT)
Dept: AUDIOLOGY | Facility: CLINIC | Age: 58
End: 2023-01-18

## 2023-01-18 ENCOUNTER — CLINICAL SUPPORT (OUTPATIENT)
Dept: INTERNAL MEDICINE | Facility: CLINIC | Age: 58
End: 2023-01-18
Payer: COMMERCIAL

## 2023-01-18 ENCOUNTER — OFFICE VISIT (OUTPATIENT)
Dept: INTERNAL MEDICINE | Facility: CLINIC | Age: 58
End: 2023-01-18
Payer: COMMERCIAL

## 2023-01-18 VITALS
TEMPERATURE: 100 F | WEIGHT: 264.56 LBS | SYSTOLIC BLOOD PRESSURE: 142 MMHG | BODY MASS INDEX: 37.04 KG/M2 | DIASTOLIC BLOOD PRESSURE: 94 MMHG | HEIGHT: 71 IN | HEART RATE: 103 BPM

## 2023-01-18 DIAGNOSIS — Z01.12 HEARING CONSERVATION AND TREATMENT EXAM: Primary | ICD-10-CM

## 2023-01-18 DIAGNOSIS — E66.01 SEVERE OBESITY (BMI 35.0-39.9) WITH COMORBIDITY: ICD-10-CM

## 2023-01-18 DIAGNOSIS — Z86.010 HISTORY OF COLON POLYPS: ICD-10-CM

## 2023-01-18 DIAGNOSIS — Z00.00 ROUTINE GENERAL MEDICAL EXAMINATION AT A HEALTH CARE FACILITY: Primary | ICD-10-CM

## 2023-01-18 DIAGNOSIS — Z71.3 DIETARY COUNSELING: Primary | ICD-10-CM

## 2023-01-18 DIAGNOSIS — H91.93 BILATERAL HEARING LOSS, UNSPECIFIED HEARING LOSS TYPE: ICD-10-CM

## 2023-01-18 DIAGNOSIS — I10 PRIMARY HYPERTENSION: ICD-10-CM

## 2023-01-18 PROBLEM — Z12.11 SCREEN FOR COLON CANCER: Status: RESOLVED | Noted: 2019-09-17 | Resolved: 2023-01-18

## 2023-01-18 PROBLEM — R79.82 ELEVATED C-REACTIVE PROTEIN (CRP): Status: RESOLVED | Noted: 2019-12-17 | Resolved: 2023-01-18

## 2023-01-18 LAB
ALBUMIN SERPL BCP-MCNC: 4.1 G/DL (ref 3.5–5.2)
ALP SERPL-CCNC: 91 U/L (ref 55–135)
ALT SERPL W/O P-5'-P-CCNC: 54 U/L (ref 10–44)
ANION GAP SERPL CALC-SCNC: 11 MMOL/L (ref 8–16)
AST SERPL-CCNC: 30 U/L (ref 10–40)
BILIRUB SERPL-MCNC: 0.5 MG/DL (ref 0.1–1)
BILIRUB UR QL STRIP: NEGATIVE
BUN SERPL-MCNC: 19 MG/DL (ref 6–20)
CALCIUM SERPL-MCNC: 9.2 MG/DL (ref 8.7–10.5)
CHLORIDE SERPL-SCNC: 104 MMOL/L (ref 95–110)
CHOLEST SERPL-MCNC: 189 MG/DL (ref 120–199)
CHOLEST/HDLC SERPL: 3.8 {RATIO} (ref 2–5)
CLARITY UR: CLEAR
CO2 SERPL-SCNC: 25 MMOL/L (ref 23–29)
COLOR UR: YELLOW
COMPLEXED PSA SERPL-MCNC: 0.34 NG/ML (ref 0–4)
CREAT SERPL-MCNC: 1 MG/DL (ref 0.5–1.4)
ERYTHROCYTE [DISTWIDTH] IN BLOOD BY AUTOMATED COUNT: 12.5 % (ref 11.5–14.5)
EST. GFR  (NO RACE VARIABLE): >60 ML/MIN/1.73 M^2
ESTIMATED AVG GLUCOSE: 111 MG/DL (ref 68–131)
GLUCOSE SERPL-MCNC: 116 MG/DL (ref 70–110)
GLUCOSE UR QL STRIP: NEGATIVE
HBA1C MFR BLD: 5.5 % (ref 4–5.6)
HCT VFR BLD AUTO: 46.3 % (ref 40–54)
HDLC SERPL-MCNC: 50 MG/DL (ref 40–75)
HDLC SERPL: 26.5 % (ref 20–50)
HGB BLD-MCNC: 15.9 G/DL (ref 14–18)
HGB UR QL STRIP: NEGATIVE
KETONES UR QL STRIP: NEGATIVE
LDLC SERPL CALC-MCNC: 123.8 MG/DL (ref 63–159)
LEUKOCYTE ESTERASE UR QL STRIP: NEGATIVE
MCH RBC QN AUTO: 31.4 PG (ref 27–31)
MCHC RBC AUTO-ENTMCNC: 34.3 G/DL (ref 32–36)
MCV RBC AUTO: 91 FL (ref 82–98)
NITRITE UR QL STRIP: NEGATIVE
NONHDLC SERPL-MCNC: 139 MG/DL
PH UR STRIP: 6 [PH] (ref 5–8)
PLATELET # BLD AUTO: 279 K/UL (ref 150–450)
PMV BLD AUTO: 10.2 FL (ref 9.2–12.9)
POTASSIUM SERPL-SCNC: 4.4 MMOL/L (ref 3.5–5.1)
PROT SERPL-MCNC: 7 G/DL (ref 6–8.4)
PROT UR QL STRIP: NEGATIVE
RBC # BLD AUTO: 5.07 M/UL (ref 4.6–6.2)
SODIUM SERPL-SCNC: 140 MMOL/L (ref 136–145)
SP GR UR STRIP: 1.02 (ref 1–1.03)
TRIGL SERPL-MCNC: 76 MG/DL (ref 30–150)
TSH SERPL DL<=0.005 MIU/L-ACNC: 2.62 UIU/ML (ref 0.4–4)
URN SPEC COLLECT METH UR: NORMAL
WBC # BLD AUTO: 8.31 K/UL (ref 3.9–12.7)

## 2023-01-18 PROCEDURE — 3080F PR MOST RECENT DIASTOLIC BLOOD PRESSURE >= 90 MM HG: ICD-10-PCS | Mod: CPTII,S$GLB,, | Performed by: FAMILY MEDICINE

## 2023-01-18 PROCEDURE — 1160F PR REVIEW ALL MEDS BY PRESCRIBER/CLIN PHARMACIST DOCUMENTED: ICD-10-PCS | Mod: CPTII,S$GLB,, | Performed by: FAMILY MEDICINE

## 2023-01-18 PROCEDURE — 84153 ASSAY OF PSA TOTAL: CPT | Performed by: FAMILY MEDICINE

## 2023-01-18 PROCEDURE — 3008F BODY MASS INDEX DOCD: CPT | Mod: CPTII,S$GLB,, | Performed by: FAMILY MEDICINE

## 2023-01-18 PROCEDURE — 97802 MEDICAL NUTRITION INDIV IN: CPT | Mod: S$GLB,,, | Performed by: INTERNAL MEDICINE

## 2023-01-18 PROCEDURE — 3008F PR BODY MASS INDEX (BMI) DOCUMENTED: ICD-10-PCS | Mod: CPTII,S$GLB,, | Performed by: FAMILY MEDICINE

## 2023-01-18 PROCEDURE — 99999 PR PBB SHADOW E&M-EST. PATIENT-LVL III: CPT | Mod: PBBFAC,,, | Performed by: FAMILY MEDICINE

## 2023-01-18 PROCEDURE — 3077F SYST BP >= 140 MM HG: CPT | Mod: CPTII,S$GLB,, | Performed by: FAMILY MEDICINE

## 2023-01-18 PROCEDURE — 83655 ASSAY OF LEAD: CPT | Performed by: FAMILY MEDICINE

## 2023-01-18 PROCEDURE — 85027 COMPLETE CBC AUTOMATED: CPT | Performed by: FAMILY MEDICINE

## 2023-01-18 PROCEDURE — 3077F PR MOST RECENT SYSTOLIC BLOOD PRESSURE >= 140 MM HG: ICD-10-PCS | Mod: CPTII,S$GLB,, | Performed by: FAMILY MEDICINE

## 2023-01-18 PROCEDURE — 99396 PR PREVENTIVE VISIT,EST,40-64: ICD-10-PCS | Mod: S$GLB,,, | Performed by: FAMILY MEDICINE

## 2023-01-18 PROCEDURE — 1159F PR MEDICATION LIST DOCUMENTED IN MEDICAL RECORD: ICD-10-PCS | Mod: CPTII,S$GLB,, | Performed by: FAMILY MEDICINE

## 2023-01-18 PROCEDURE — 3080F DIAST BP >= 90 MM HG: CPT | Mod: CPTII,S$GLB,, | Performed by: FAMILY MEDICINE

## 2023-01-18 PROCEDURE — 80053 COMPREHEN METABOLIC PANEL: CPT | Performed by: FAMILY MEDICINE

## 2023-01-18 PROCEDURE — 97802 PR MED NUTR THER, 1ST, INDIV, EA 15 MIN: ICD-10-PCS | Mod: S$GLB,,, | Performed by: INTERNAL MEDICINE

## 2023-01-18 PROCEDURE — 84443 ASSAY THYROID STIM HORMONE: CPT | Performed by: FAMILY MEDICINE

## 2023-01-18 PROCEDURE — 99999 PR PBB SHADOW E&M-EST. PATIENT-LVL I: CPT | Mod: PBBFAC,,, | Performed by: AUDIOLOGIST

## 2023-01-18 PROCEDURE — 99396 PREV VISIT EST AGE 40-64: CPT | Mod: S$GLB,,, | Performed by: FAMILY MEDICINE

## 2023-01-18 PROCEDURE — 83036 HEMOGLOBIN GLYCOSYLATED A1C: CPT | Performed by: FAMILY MEDICINE

## 2023-01-18 PROCEDURE — 81003 URINALYSIS AUTO W/O SCOPE: CPT | Performed by: FAMILY MEDICINE

## 2023-01-18 PROCEDURE — 99999 PR PBB SHADOW E&M-EST. PATIENT-LVL III: ICD-10-PCS | Mod: PBBFAC,,, | Performed by: FAMILY MEDICINE

## 2023-01-18 PROCEDURE — 99999 PR PBB SHADOW E&M-EST. PATIENT-LVL I: ICD-10-PCS | Mod: PBBFAC,,, | Performed by: AUDIOLOGIST

## 2023-01-18 PROCEDURE — 1160F RVW MEDS BY RX/DR IN RCRD: CPT | Mod: CPTII,S$GLB,, | Performed by: FAMILY MEDICINE

## 2023-01-18 PROCEDURE — 92552 PR PURE TONE AUDIOMETRY, AIR: ICD-10-PCS | Mod: S$GLB,,, | Performed by: AUDIOLOGIST

## 2023-01-18 PROCEDURE — 80061 LIPID PANEL: CPT | Performed by: FAMILY MEDICINE

## 2023-01-18 PROCEDURE — 1159F MED LIST DOCD IN RCRD: CPT | Mod: CPTII,S$GLB,, | Performed by: FAMILY MEDICINE

## 2023-01-18 PROCEDURE — 92552 PURE TONE AUDIOMETRY AIR: CPT | Mod: S$GLB,,, | Performed by: AUDIOLOGIST

## 2023-01-18 NOTE — PROGRESS NOTES
Subjective:   Patient ID: Nathan Barrera is a 57 y.o. male.  Chief Complaint:  Executive Health    Presents for executive Health evaluation 5.   PCP Dr. Ian Davis  Medical history for hypertension, obesity, colon polyps, and bilateral hearing loss  Surgical history for tonsillectomy, adenoidectomy, and colonoscopy with polypectomy  Current medications include Lotrel 5/20 mg daily   No known drug allergies   Social history employed by [employer].   with 2 children.  No tobacco illicit drug use.  Drinks 12 pack per week.    Family history includes father, alive, hypertension and heart arrhythmia.  Mother, alive, hypertension, lung disease, gallbladder disease.  No colon cancer or prostate cancer  Routine health maintenance includes a reported tetanus booster within 10 years, no recent flu vaccines, no previous COVID vaccines, no previous shingles vaccines, and a reported colonoscopy with polypectomy in 2020.    No specific complaints or concerns today      Current Outpatient Medications:     amlodipine-benazepril 5-20 mg (LOTREL) 5-20 mg per capsule, TAKE 1 CAPSULE BY MOUTH DAILY, Disp: 90 capsule, Rfl: 0    ascorbate calcium 500 mg Tab, Take 500 mg by mouth daily as needed., Disp: , Rfl:     ascorbic acid, vitamin C, (VITAMIN C) 500 MG tablet, Take 500 mg by mouth once daily., Disp: , Rfl:     aspirin (ECOTRIN) 81 MG EC tablet, Take 81 mg by mouth daily as needed., Disp: , Rfl:     fish oil-omega-3 fatty acids 300-1,000 mg capsule, Take 1 capsule by mouth once daily. , Disp: , Rfl:     multivitamin with minerals tablet, Take 1 tablet by mouth once daily., Disp: , Rfl:     Review of Systems   Constitutional:  Negative for chills, fatigue and fever.   HENT:  Negative for congestion, dental problem, ear discharge, ear pain, postnasal drip, rhinorrhea, sinus pressure, sinus pain, sore throat and trouble swallowing.    Eyes:  Negative for pain, redness and visual disturbance.   Respiratory:   "Negative for cough, chest tightness, shortness of breath and wheezing.    Cardiovascular:  Negative for chest pain, palpitations and leg swelling.   Gastrointestinal:  Negative for abdominal pain, constipation, diarrhea, nausea and vomiting.   Endocrine: Negative for polydipsia, polyphagia and polyuria.   Genitourinary:  Negative for difficulty urinating, dysuria, flank pain, frequency, hematuria and urgency.   Musculoskeletal:  Negative for myalgias and neck pain.   Skin:  Negative for rash.   Neurological:  Negative for dizziness, syncope, weakness, light-headedness and headaches.   Hematological:  Negative for adenopathy.   Psychiatric/Behavioral:  Negative for sleep disturbance. The patient is not nervous/anxious.      Objective:   BP (!) 142/94   Pulse 103   Temp 99.6 °F (37.6 °C)   Ht 5' 11" (1.803 m)   Wt 120 kg (264 lb 8.8 oz)   BMI 36.90 kg/m²     Physical Exam  Vitals and nursing note reviewed.   Constitutional:       Appearance: Normal appearance. He is well-developed. He is obese.      Comments:   Blood pressure elevated   HENT:      Right Ear: Hearing, tympanic membrane, ear canal and external ear normal.      Left Ear: Hearing, tympanic membrane, ear canal and external ear normal.      Nose: Nose normal. No mucosal edema, congestion or rhinorrhea.      Right Sinus: No maxillary sinus tenderness or frontal sinus tenderness.      Left Sinus: No maxillary sinus tenderness or frontal sinus tenderness.      Mouth/Throat:      Pharynx: Oropharynx is clear. Uvula midline. No pharyngeal swelling, oropharyngeal exudate or posterior oropharyngeal erythema.   Neck:      Thyroid: No thyroid mass, thyromegaly or thyroid tenderness.   Cardiovascular:      Rate and Rhythm: Normal rate and regular rhythm.      Heart sounds: Normal heart sounds.   Pulmonary:      Effort: Pulmonary effort is normal.      Breath sounds: Normal breath sounds and air entry.   Abdominal:      General: There is no distension.      " Palpations: Abdomen is soft.      Tenderness: There is no abdominal tenderness.   Musculoskeletal:      Right lower leg: No edema.      Left lower leg: No edema.   Lymphadenopathy:      Cervical: No cervical adenopathy.   Skin:     Findings: No rash.   Psychiatric:         Mood and Affect: Mood and affect normal.     CBC with normal white blood cell count, red blood cell count, and platelet levels.  Kidney, Liver, and Electrolyte tests are all normal.  Glucose elevated.  A1c pending.    Total cholesterol 189.  Triglycerides 76.  HDL 50.  .  Ten year risk 8%.    Urinalysis normal  TSH, PSA, lead level pending     Pulmonary function test pending     Audiology exam showed stable mild-to-moderately severe hearing loss 3207-1064 Hz for the right ear, and moderate-to-moderately severe hearing loss 2517-3314 Hz for the left ear. Recommendations include:   1.  Complete Audiological Evaluation  2.  ENT followup  3.  Possible Hearing aid consult pending candidacy   4.  Wear hearing protective devices around loud noise  5.  Annual audiograms      Assessment:       ICD-10-CM ICD-9-CM   1. Routine general medical examination at a health care facility  Z00.00 V70.0   2. Primary hypertension  I10 401.9   3. Severe obesity (BMI 35.0-39.9) with comorbidity  E66.01 278.01   4. History of colon polyps  Z86.010 V12.72   5. Bilateral hearing loss, unspecified hearing loss type  H91.93 389.9     Plan:   Routine general medical examination at a health care facility  Blood pressure elevated.  BMI 37.  Remainder exam stable.    Send full executive Health letter when all test resulted   Reported colon cancer screening up-to-date  Prostate cancer screening today   Reported tetanus booster up-to-date  Declines shingles vaccine, COVID vaccine, and flu vaccine     Primary hypertension  Uncontrolled.  BP elevated.  Not at goal.  Reports normal blood pressures at home.    Continue Lotrel 5/20 mg daily  Follow-up PCP as scheduled     Severe  obesity (BMI 35.0-39.9) with comorbidity  Discussed increased BMI, Increased health risks, Need for weight loss, Lifestyle modifications.    History of colon polyps  Reported colonoscopy up-to-date due for repeat 2025     Bilateral hearing loss, unspecified hearing loss type  Stable on today's exam   Declines additional evaluation and control hearing a as recommended     Return to clinic 1 year for executive Health evaluation

## 2023-01-18 NOTE — LETTER
January 18, 2023    Nathan Barrera  [address]  [place] [address]             [address]  [address] [facility] [place] [place] [address]  Phone: [phone]  Fax: [phone] Dear Mr. Barrera,    On January 18. 2023, it was a pleasure to meet you and perform your fifth Executive Health evaluation. Your Primary Care physician is Dr. Ian Davis. At the time of this visit, you are 57 years old. You denied any specific complaints or concerns during todays visit.      YOUR PAST MEDICAL HISTORY includes hypertension, obesity, colon polyps, and bilateral hearing loss.    YOUR PAST SURGICAL HISTORY includes a tonsillectomy, and adenoidectomy, and a colonoscopy with polypectomy.    YOUR CURRENT MEDICATIONS include Lotrel 5/20 mg daily.    You do not have ANY KNOWN DRUG ALLERGIES.    YOUR SOCIAL HISTORY includes employment by the [employer]. You are  with two children. You drink approximately 12 beers per week. You denied any tobacco or illicit drug use.    YOUR FAMILY HISTORY includes your father, alive, with hypertension and heart arrhythmias. Your mother, alive, with hypertension, lung disease, and gallbladder disease. No known colon or prostate cancer.    YOUR ROUTINE HEALTH MAINTENANCE includes a reported tetanus booster within 10 years, no recent flu vaccines, no previous COVID vaccines, no previous shingles vaccines, and a reported colonoscopy with polypectomy in 2020.    PHYSICAL EXAMINATION: You are 5 ft 11 in tall, weighing 265 pounds with a body mass index of 27. This places you in the Severe Obesity category. Your initial blood pressure was 159/107 and elevated. A repeat blood pressure was 142/94 and remained elevated. Your heart rate is 103 beats per minute. Other than your elevated blood pressure and obesity, your physical examination did not reveal any additional significant abnormal findings.      YOUR BLOOD WORK AND ADDITIONAL TESTS: Reveal normal white cell  counts, red cell counts, and platelet levels. You are not Anemic. Your kidney, liver, and electrolyte tests are normal. Your total cholesterol is 189. Your triglycerides are 76. Your HDL is 50. Your LDL is 124.  Based on these numbers, your 10-year risk of heart attack or stroke is 8%.  Your glucose is 116 and elevated. Your hemoglobin A1c is 5.5%, which is in a normal range.  You do not have Prediabetes or Diabetes. Your TSH is 2.625, which is in a normal range. You do not have an active Thyroid problem.  Your serum PSA is .0.34, which is in a normal range. You do not have any suspicions for Prostate cancer. Your urinalysis is normal.  Your serum lead level is normal.      Your Audiology exam revealed stable mild-to-moderately severe hearing loss 7762-2826 Hz for the right ear, and moderate-to-moderately severe hearing loss 5695-2504 Hz for the left ear.  Complete audiological evaluation with ENT follow-up and possible hearing aids are recommended. Wearing hearing protective devices around loud noises is strongly encouraged.    In summary, you are overall in poor health. You are Obese. This puts you at increased risk for medical problems in the future. Lifestyle modifications to promote weight loss, like regular physical activity and decreased caloric intake, are encouraged.  Your hypertension does not appear adequately controlled on your current medication. Your 10-year risk of heart attack or stroke is <10%. Daily aspirin or cholesterol medications are not recommended.    Based on the [place] Preventive Task Force recommendations, you should receive yearly Influenza vaccinations. You should have a Tetanus booster every 10 years. You should complete the COVID vaccine series. You should complete the shingles vaccines.  As reported, you are due for repeat colon cancer screening in 2025    It was a pleasure to meet you and perform this Executive Health evaluation.  Please follow-up with Dr. Davis for  adjustment in your blood pressure medication and to discuss weight loss management options. If I can be of any further assistance, or if you have any additional questions or concerns, please do not hesitate to let me know.    Sincerely,      Nam Che MD, FAAFP

## 2023-01-18 NOTE — PROGRESS NOTES
"Nutrition Assessment  Session Time:        Client name:  Nathan Barrera  :  1965  Age:  57 y.o.  Gender:  male    Client states:  [employer] employee present for annual physical.  Last physical was completed in 2021.  Reports being aware of changes that should be made to diet.  Weight tends to vary throughout the year depending on season and events occurring.  Reports being more consistent with fish oil supplement during the last year.     Not seeking any guidance today.  Present as a requirement for his job.        2021: Client states:  [employer] employee present for [employer] physical. Last physical was in 2020.  Continues with same routine and doing good for a while with healthier habits and then falling off track. Not wanting to make any changes right now. Reports will make attempts to do better at the beginning of the year. Food and exercise history provided below. Well aware of changes that need to be made. Not seeking any guidance today.      2020:   Client states:  Pleasant gentleman from [employer] present for annual physical with [employer]. Last physical was completed in 2019. Reports being "off and on" with healthy habits for weight loss. Estimated to be 10 lbs heavier today. Knows what changes need to be made, just need to make it a point to be consistent with habits. Food and exercise history provided below. No questions today.            Anthropometrics  Height:  71"     Weight:  264 lbs   2021: 246 lbs   2020: 246 lbs  BMI:  36.90  % Body Fat:  n/a    Clinical Signs/Symptoms  N/V/D:  none noted  Appetite:  good       Past Medical History:   Diagnosis Date    Essential hypertension        Past Surgical History:   Procedure Laterality Date    TONSILLECTOMY, ADENOIDECTOMY         Medications    has a current medication list which includes the following prescription(s): amlodipine-benazepril 5-20 mg, " ascorbate calcium (vitamin c), ascorbic acid (vitamin c), aspirin, fish oil-omega-3 fatty acids, and multivitamin with minerals.    Vitamins, Minerals, and/or Supplements:  fish oil, multivitamin, Vitamin C     Food/Medication Interactions:  Reviewed     Food Allergies or Intolerances:  NKFA     Social History    Marital status:  Single  Employment:  [employer]    Social History     Tobacco Use    Smoking status: Never    Smokeless tobacco: Never   Substance Use Topics    Alcohol use: Not on file        Lab Reports   Sodium   Date Value Ref Range Status   01/18/2023 140 136 - 145 mmol/L Final     Potassium   Date Value Ref Range Status   01/18/2023 4.4 3.5 - 5.1 mmol/L Final     Chloride   Date Value Ref Range Status   01/18/2023 104 95 - 110 mmol/L Final     CO2   Date Value Ref Range Status   01/18/2023 25 23 - 29 mmol/L Final     Glucose   Date Value Ref Range Status   01/18/2023 116 (H) 70 - 110 mg/dL Final     BUN   Date Value Ref Range Status   01/18/2023 19 6 - 20 mg/dL Final     Creatinine   Date Value Ref Range Status   01/18/2023 1.0 0.5 - 1.4 mg/dL Final     Calcium   Date Value Ref Range Status   01/18/2023 9.2 8.7 - 10.5 mg/dL Final     Total Protein   Date Value Ref Range Status   01/18/2023 7.0 6.0 - 8.4 g/dL Final     Albumin   Date Value Ref Range Status   01/18/2023 4.1 3.5 - 5.2 g/dL Final     Total Bilirubin   Date Value Ref Range Status   01/18/2023 0.5 0.1 - 1.0 mg/dL Final     Comment:     For infants and newborns, interpretation of results should be based  on gestational age, weight and in agreement with clinical  observations.    Premature Infant recommended reference ranges:  Up to 24 hours.............<8.0 mg/dL  Up to 48 hours............<12.0 mg/dL  3-5 days..................<15.0 mg/dL  6-29 days.................<15.0 mg/dL       Alkaline Phosphatase   Date Value Ref Range Status   01/18/2023 91 55 - 135 U/L Final     AST   Date Value Ref Range Status   01/18/2023 30 10 - 40 U/L Final     ALT    Date Value Ref Range Status   01/18/2023 54 (H) 10 - 44 U/L Final     Anion Gap   Date Value Ref Range Status   01/18/2023 11 8 - 16 mmol/L Final     eGFR if    Date Value Ref Range Status   12/09/2021 >60 >60 mL/min/1.73 m^2 Final     eGFR if non    Date Value Ref Range Status   12/09/2021 >60 >60 mL/min/1.73 m^2 Final     Comment:     Calculation used to obtain the estimated glomerular filtration  rate (eGFR) is the CKD-EPI equation.         Lab Results   Component Value Date    WBC 8.31 01/18/2023    HGB 15.9 01/18/2023    HCT 46.3 01/18/2023    MCV 91 01/18/2023     01/18/2023        Lab Results   Component Value Date    CHOL 189 01/18/2023     Lab Results   Component Value Date    HDL 50 01/18/2023     Lab Results   Component Value Date    LDLCALC 123.8 01/18/2023     Lab Results   Component Value Date    TRIG 76 01/18/2023     Lab Results   Component Value Date    CHOLHDL 26.5 01/18/2023     Lab Results   Component Value Date    HGBA1C 5.4 12/09/2021     BP Readings from Last 1 Encounters:   01/18/23 (!) 159/107       Food History  Meals: skips meals, at least 2 meals   Snacks: limited sweet   Vegetables/fruits: daily, more vegetables  Drinks: water, limited soda intake (1-2 per week), sweet tea (at work)  Alcohol: moderate intake  Dine out: twice     Exercise History:  inconsistent// active with daily activities    Cultural/Spiritual/Personal Preferences:  None identified    Support System:   none noted    State of Change:  Precontemplation    Barriers to Change:  readiness to change    Diagnosis    No nutrition diagnosis at this time.     Intervention    RMR (Method:  [place] St[employer] Jeor):  2055 kcal  Activity Factor:   1.2     MADELAINE:  2466 - 500 = 1966 kcal    Goals:  1.  No goals were established today.       Nutrition Education  The following education was provided to the patient:  Suggested dietary modifications based on current dietary behaviors and individual food  preferences.  Discussed nutrition-related lab values and dietary and/or lifestyle factors affecting them.    Patient verbalized understanding of nutrition education and recommendations received.    Handouts Provided  None    Monitoring/Evaluation    Monitor the following:  Weight  BMI  Caloric intake  Labs:  A1c, lipid panel    Follow Up Plan:  Communication with referring healthcare provider is unnecessary at this time as patient presented as part of annual wellness exam.  However, will follow up with patient in 1-2 years.

## 2023-01-18 NOTE — PROGRESS NOTES
Executive Health Screening    Nathan Barrera was seen 01/18/2023 for an [facility] hearing screen.      Results reveal a mild-to-moderately severe hearing loss 7888-4531 Hz for the right ear, and  moderate-to-moderately severe hearing loss 4210-9103 Hz for the left ear.     Otoscopy was unremarkable.    Patient was counseled on the above findings.     Recommendations include:    1.  Complete Audiological Evaluation  2.  ENT followup  3.  Possible Hearing aid consult pending candidacy   4.  Wear hearing protective devices around loud noise  5.  Annual audiograms

## 2023-01-25 LAB
LEAD BLD-MCNC: 1.6 MCG/DL
SPECIMEN SOURCE: NORMAL
STATE OF RESIDENCE: NORMAL

## 2024-01-10 ENCOUNTER — TELEPHONE (OUTPATIENT)
Dept: RESEARCH | Facility: HOSPITAL | Age: 59
End: 2024-01-10
Payer: COMMERCIAL